# Patient Record
Sex: MALE | Race: WHITE | Employment: UNEMPLOYED | URBAN - METROPOLITAN AREA
[De-identification: names, ages, dates, MRNs, and addresses within clinical notes are randomized per-mention and may not be internally consistent; named-entity substitution may affect disease eponyms.]

---

## 2022-08-31 RX ORDER — HYDROCODONE BITARTRATE AND ACETAMINOPHEN 5; 325 MG/1; MG/1
1 TABLET ORAL EVERY 6 HOURS PRN
Status: ON HOLD | COMMUNITY
End: 2022-09-09 | Stop reason: HOSPADM

## 2022-08-31 RX ORDER — NAPROXEN 500 MG/1
500 TABLET ORAL 2 TIMES DAILY WITH MEALS
Status: ON HOLD | COMMUNITY
End: 2022-09-09 | Stop reason: HOSPADM

## 2022-09-09 ENCOUNTER — HOSPITAL ENCOUNTER (OUTPATIENT)
Age: 22
Setting detail: OUTPATIENT SURGERY
Discharge: HOME | End: 2022-09-09
Attending: ORTHOPAEDIC SURGERY | Admitting: ORTHOPAEDIC SURGERY
Payer: COMMERCIAL

## 2022-09-09 ENCOUNTER — ANESTHESIA (OUTPATIENT)
Dept: OPERATING ROOM | Age: 22
End: 2022-09-09
Payer: COMMERCIAL

## 2022-09-09 ENCOUNTER — ANESTHESIA EVENT (OUTPATIENT)
Dept: OPERATING ROOM | Age: 22
End: 2022-09-09
Payer: COMMERCIAL

## 2022-09-09 VITALS
OXYGEN SATURATION: 100 % | TEMPERATURE: 97.4 F | HEIGHT: 71 IN | DIASTOLIC BLOOD PRESSURE: 78 MMHG | RESPIRATION RATE: 12 BRPM | WEIGHT: 178 LBS | BODY MASS INDEX: 24.92 KG/M2 | HEART RATE: 64 BPM | SYSTOLIC BLOOD PRESSURE: 126 MMHG

## 2022-09-09 DIAGNOSIS — S82.142D CLOSED FRACTURE OF LEFT TIBIAL PLATEAU WITH ROUTINE HEALING, SUBSEQUENT ENCOUNTER: Primary | ICD-10-CM

## 2022-09-09 PROCEDURE — 3600000004 HC SURGERY LEVEL 4 BASE: Performed by: ORTHOPAEDIC SURGERY

## 2022-09-09 PROCEDURE — 6360000002 HC RX W HCPCS: Performed by: ANESTHESIOLOGY

## 2022-09-09 PROCEDURE — 64445 NJX AA&/STRD SCIATIC NRV IMG: CPT | Performed by: ANESTHESIOLOGY

## 2022-09-09 PROCEDURE — 6370000000 HC RX 637 (ALT 250 FOR IP): Performed by: ANESTHESIOLOGY

## 2022-09-09 PROCEDURE — 3600000014 HC SURGERY LEVEL 4 ADDTL 15MIN: Performed by: ORTHOPAEDIC SURGERY

## 2022-09-09 PROCEDURE — 6370000000 HC RX 637 (ALT 250 FOR IP)

## 2022-09-09 PROCEDURE — 2709999900 HC NON-CHARGEABLE SUPPLY: Performed by: ORTHOPAEDIC SURGERY

## 2022-09-09 PROCEDURE — 76942 ECHO GUIDE FOR BIOPSY: CPT | Performed by: ANESTHESIOLOGY

## 2022-09-09 PROCEDURE — 6360000002 HC RX W HCPCS

## 2022-09-09 PROCEDURE — 7100000010 HC PHASE II RECOVERY - FIRST 15 MIN: Performed by: ORTHOPAEDIC SURGERY

## 2022-09-09 PROCEDURE — 2580000003 HC RX 258: Performed by: ANESTHESIOLOGY

## 2022-09-09 PROCEDURE — 3700000000 HC ANESTHESIA ATTENDED CARE: Performed by: ORTHOPAEDIC SURGERY

## 2022-09-09 PROCEDURE — 7100000000 HC PACU RECOVERY - FIRST 15 MIN: Performed by: ORTHOPAEDIC SURGERY

## 2022-09-09 PROCEDURE — C1713 ANCHOR/SCREW BN/BN,TIS/BN: HCPCS | Performed by: ORTHOPAEDIC SURGERY

## 2022-09-09 PROCEDURE — 2500000003 HC RX 250 WO HCPCS

## 2022-09-09 PROCEDURE — 7100000001 HC PACU RECOVERY - ADDTL 15 MIN: Performed by: ORTHOPAEDIC SURGERY

## 2022-09-09 PROCEDURE — 3700000001 HC ADD 15 MINUTES (ANESTHESIA): Performed by: ORTHOPAEDIC SURGERY

## 2022-09-09 PROCEDURE — 6360000002 HC RX W HCPCS: Performed by: ORTHOPAEDIC SURGERY

## 2022-09-09 PROCEDURE — 2720000010 HC SURG SUPPLY STERILE: Performed by: ORTHOPAEDIC SURGERY

## 2022-09-09 PROCEDURE — 7100000011 HC PHASE II RECOVERY - ADDTL 15 MIN: Performed by: ORTHOPAEDIC SURGERY

## 2022-09-09 PROCEDURE — 2580000003 HC RX 258: Performed by: ORTHOPAEDIC SURGERY

## 2022-09-09 DEVICE — EVOS 3.5MM X 10MM LOCKING SCREW SELF-TAPPING
Type: IMPLANTABLE DEVICE | Site: ANTERIOR TIBIAL | Status: FUNCTIONAL
Brand: EVOS

## 2022-09-09 DEVICE — EVOS 3.5MM X 36MM CORTEX SCREW SELF-TAPPING
Type: IMPLANTABLE DEVICE | Site: ANTERIOR TIBIAL | Status: FUNCTIONAL
Brand: EVOS

## 2022-09-09 DEVICE — ENDOBUTTON CL ULTRA 15MM
Type: IMPLANTABLE DEVICE | Site: KNEE | Status: FUNCTIONAL
Brand: ENDOBUTTON

## 2022-09-09 DEVICE — EVOS 3.5MM PARTIAL ARTICULAR                                    LATERAL PROXIMAL TIBIA PLATE 4 HOLE                                    LEFT 70MM
Type: IMPLANTABLE DEVICE | Site: ANTERIOR TIBIAL | Status: FUNCTIONAL
Brand: EVOS

## 2022-09-09 DEVICE — EVOS 3.5MM X 34MM CORTEX SCREW SELF-TAPPING
Type: IMPLANTABLE DEVICE | Site: ANTERIOR TIBIAL | Status: FUNCTIONAL
Brand: EVOS

## 2022-09-09 RX ORDER — APREPITANT 40 MG/1
CAPSULE ORAL
Status: COMPLETED
Start: 2022-09-09 | End: 2022-09-09

## 2022-09-09 RX ORDER — ONDANSETRON 4 MG/1
4 TABLET, FILM COATED ORAL EVERY 6 HOURS PRN
Qty: 20 TABLET | Refills: 0 | Status: SHIPPED | OUTPATIENT
Start: 2022-09-09

## 2022-09-09 RX ORDER — MIDAZOLAM HYDROCHLORIDE 1 MG/ML
INJECTION INTRAMUSCULAR; INTRAVENOUS
Status: COMPLETED
Start: 2022-09-09 | End: 2022-09-09

## 2022-09-09 RX ORDER — APREPITANT 40 MG/1
40 CAPSULE ORAL ONCE
Status: DISCONTINUED | OUTPATIENT
Start: 2022-09-09 | End: 2022-09-09 | Stop reason: HOSPADM

## 2022-09-09 RX ORDER — CEFAZOLIN 2 G/1
INJECTION, POWDER, FOR SOLUTION INTRAMUSCULAR; INTRAVENOUS
Status: DISCONTINUED
Start: 2022-09-09 | End: 2022-09-09 | Stop reason: HOSPADM

## 2022-09-09 RX ORDER — LIDOCAINE HYDROCHLORIDE 10 MG/ML
INJECTION, SOLUTION INFILTRATION; PERINEURAL PRN
Status: DISCONTINUED | OUTPATIENT
Start: 2022-09-09 | End: 2022-09-09 | Stop reason: SDUPTHER

## 2022-09-09 RX ORDER — SCOLOPAMINE TRANSDERMAL SYSTEM 1 MG/1
1 PATCH, EXTENDED RELEASE TRANSDERMAL ONCE
Status: DISCONTINUED | OUTPATIENT
Start: 2022-09-09 | End: 2022-09-09 | Stop reason: HOSPADM

## 2022-09-09 RX ORDER — LABETALOL HYDROCHLORIDE 5 MG/ML
5 INJECTION, SOLUTION INTRAVENOUS EVERY 10 MIN PRN
Status: DISCONTINUED | OUTPATIENT
Start: 2022-09-09 | End: 2022-09-09 | Stop reason: HOSPADM

## 2022-09-09 RX ORDER — OXYCODONE HYDROCHLORIDE 5 MG/1
5 TABLET ORAL PRN
Status: COMPLETED | OUTPATIENT
Start: 2022-09-09 | End: 2022-09-09

## 2022-09-09 RX ORDER — DOCUSATE SODIUM 100 MG/1
100 CAPSULE, LIQUID FILLED ORAL 2 TIMES DAILY PRN
Qty: 20 CAPSULE | Refills: 0 | Status: SHIPPED | OUTPATIENT
Start: 2022-09-09 | End: 2022-10-09

## 2022-09-09 RX ORDER — MEPERIDINE HYDROCHLORIDE 25 MG/ML
12.5 INJECTION INTRAMUSCULAR; INTRAVENOUS; SUBCUTANEOUS EVERY 5 MIN PRN
Status: DISCONTINUED | OUTPATIENT
Start: 2022-09-09 | End: 2022-09-09 | Stop reason: HOSPADM

## 2022-09-09 RX ORDER — KETOROLAC TROMETHAMINE 30 MG/ML
INJECTION, SOLUTION INTRAMUSCULAR; INTRAVENOUS PRN
Status: DISCONTINUED | OUTPATIENT
Start: 2022-09-09 | End: 2022-09-09 | Stop reason: SDUPTHER

## 2022-09-09 RX ORDER — OXYCODONE HYDROCHLORIDE 5 MG/1
10 TABLET ORAL PRN
Status: COMPLETED | OUTPATIENT
Start: 2022-09-09 | End: 2022-09-09

## 2022-09-09 RX ORDER — LIDOCAINE HYDROCHLORIDE 10 MG/ML
2 INJECTION, SOLUTION INFILTRATION; PERINEURAL
Status: DISCONTINUED | OUTPATIENT
Start: 2022-09-09 | End: 2022-09-09 | Stop reason: HOSPADM

## 2022-09-09 RX ORDER — SCOLOPAMINE TRANSDERMAL SYSTEM 1 MG/1
PATCH, EXTENDED RELEASE TRANSDERMAL
Status: COMPLETED
Start: 2022-09-09 | End: 2022-09-09

## 2022-09-09 RX ORDER — ONDANSETRON 2 MG/ML
4 INJECTION INTRAMUSCULAR; INTRAVENOUS
Status: DISCONTINUED | OUTPATIENT
Start: 2022-09-09 | End: 2022-09-09 | Stop reason: HOSPADM

## 2022-09-09 RX ORDER — OXYCODONE HYDROCHLORIDE AND ACETAMINOPHEN 5; 325 MG/1; MG/1
1 TABLET ORAL EVERY 6 HOURS PRN
Qty: 28 TABLET | Refills: 0 | Status: SHIPPED | OUTPATIENT
Start: 2022-09-09 | End: 2022-09-16

## 2022-09-09 RX ORDER — MIDAZOLAM HYDROCHLORIDE 1 MG/ML
INJECTION INTRAMUSCULAR; INTRAVENOUS
Status: DISCONTINUED
Start: 2022-09-09 | End: 2022-09-09 | Stop reason: HOSPADM

## 2022-09-09 RX ORDER — SODIUM CHLORIDE 0.9 % (FLUSH) 0.9 %
5-40 SYRINGE (ML) INJECTION EVERY 12 HOURS SCHEDULED
Status: DISCONTINUED | OUTPATIENT
Start: 2022-09-09 | End: 2022-09-09 | Stop reason: HOSPADM

## 2022-09-09 RX ORDER — SODIUM CHLORIDE, SODIUM LACTATE, POTASSIUM CHLORIDE, CALCIUM CHLORIDE 600; 310; 30; 20 MG/100ML; MG/100ML; MG/100ML; MG/100ML
INJECTION, SOLUTION INTRAVENOUS CONTINUOUS
Status: DISCONTINUED | OUTPATIENT
Start: 2022-09-09 | End: 2022-09-09 | Stop reason: HOSPADM

## 2022-09-09 RX ORDER — MIDAZOLAM HYDROCHLORIDE 1 MG/ML
INJECTION INTRAMUSCULAR; INTRAVENOUS PRN
Status: DISCONTINUED | OUTPATIENT
Start: 2022-09-09 | End: 2022-09-09 | Stop reason: SDUPTHER

## 2022-09-09 RX ORDER — IBUPROFEN 800 MG/1
800 TABLET ORAL 3 TIMES DAILY PRN
Qty: 90 TABLET | Refills: 3 | Status: SHIPPED | OUTPATIENT
Start: 2022-09-09

## 2022-09-09 RX ORDER — ASPIRIN 81 MG/1
81 TABLET ORAL DAILY
Qty: 20 TABLET | Refills: 0 | Status: SHIPPED | OUTPATIENT
Start: 2022-09-09

## 2022-09-09 RX ORDER — ONDANSETRON 2 MG/ML
INJECTION INTRAMUSCULAR; INTRAVENOUS PRN
Status: DISCONTINUED | OUTPATIENT
Start: 2022-09-09 | End: 2022-09-09 | Stop reason: SDUPTHER

## 2022-09-09 RX ORDER — SODIUM CHLORIDE 9 MG/ML
INJECTION, SOLUTION INTRAVENOUS PRN
Status: DISCONTINUED | OUTPATIENT
Start: 2022-09-09 | End: 2022-09-09 | Stop reason: HOSPADM

## 2022-09-09 RX ORDER — DEXAMETHASONE SODIUM PHOSPHATE 4 MG/ML
INJECTION, SOLUTION INTRA-ARTICULAR; INTRALESIONAL; INTRAMUSCULAR; INTRAVENOUS; SOFT TISSUE PRN
Status: DISCONTINUED | OUTPATIENT
Start: 2022-09-09 | End: 2022-09-09 | Stop reason: SDUPTHER

## 2022-09-09 RX ORDER — PROPOFOL 10 MG/ML
INJECTION, EMULSION INTRAVENOUS PRN
Status: DISCONTINUED | OUTPATIENT
Start: 2022-09-09 | End: 2022-09-09 | Stop reason: SDUPTHER

## 2022-09-09 RX ORDER — DIPHENHYDRAMINE HYDROCHLORIDE 50 MG/ML
12.5 INJECTION INTRAMUSCULAR; INTRAVENOUS
Status: DISCONTINUED | OUTPATIENT
Start: 2022-09-09 | End: 2022-09-09 | Stop reason: HOSPADM

## 2022-09-09 RX ORDER — ROCURONIUM BROMIDE 10 MG/ML
INJECTION, SOLUTION INTRAVENOUS PRN
Status: DISCONTINUED | OUTPATIENT
Start: 2022-09-09 | End: 2022-09-09 | Stop reason: SDUPTHER

## 2022-09-09 RX ORDER — SODIUM CHLORIDE 0.9 % (FLUSH) 0.9 %
5-40 SYRINGE (ML) INJECTION PRN
Status: DISCONTINUED | OUTPATIENT
Start: 2022-09-09 | End: 2022-09-09 | Stop reason: HOSPADM

## 2022-09-09 RX ORDER — FENTANYL CITRATE 50 UG/ML
INJECTION, SOLUTION INTRAMUSCULAR; INTRAVENOUS PRN
Status: DISCONTINUED | OUTPATIENT
Start: 2022-09-09 | End: 2022-09-09 | Stop reason: SDUPTHER

## 2022-09-09 RX ADMIN — KETOROLAC TROMETHAMINE 30 MG: 30 INJECTION, SOLUTION INTRAMUSCULAR at 13:28

## 2022-09-09 RX ADMIN — ROCURONIUM BROMIDE 10 MG: 10 INJECTION, SOLUTION INTRAVENOUS at 10:57

## 2022-09-09 RX ADMIN — FENTANYL CITRATE 25 MCG: 50 INJECTION INTRAMUSCULAR; INTRAVENOUS at 10:08

## 2022-09-09 RX ADMIN — FENTANYL CITRATE 25 MCG: 50 INJECTION INTRAMUSCULAR; INTRAVENOUS at 10:57

## 2022-09-09 RX ADMIN — HYDROMORPHONE HYDROCHLORIDE 0.25 MG: 1 INJECTION, SOLUTION INTRAMUSCULAR; INTRAVENOUS; SUBCUTANEOUS at 14:37

## 2022-09-09 RX ADMIN — FENTANYL CITRATE 25 MCG: 50 INJECTION INTRAMUSCULAR; INTRAVENOUS at 13:37

## 2022-09-09 RX ADMIN — CEFAZOLIN 2000 MG: 2 INJECTION, POWDER, FOR SOLUTION INTRAMUSCULAR; INTRAVENOUS at 13:01

## 2022-09-09 RX ADMIN — LIDOCAINE HYDROCHLORIDE 100 MG: 10 INJECTION, SOLUTION INFILTRATION; PERINEURAL at 09:09

## 2022-09-09 RX ADMIN — SODIUM CHLORIDE, POTASSIUM CHLORIDE, SODIUM LACTATE AND CALCIUM CHLORIDE: 600; 310; 30; 20 INJECTION, SOLUTION INTRAVENOUS at 07:23

## 2022-09-09 RX ADMIN — SUGAMMADEX 200 MG: 100 INJECTION, SOLUTION INTRAVENOUS at 13:14

## 2022-09-09 RX ADMIN — ROCURONIUM BROMIDE 10 MG: 10 INJECTION, SOLUTION INTRAVENOUS at 10:06

## 2022-09-09 RX ADMIN — MIDAZOLAM 2 MG: 1 INJECTION INTRAMUSCULAR; INTRAVENOUS at 09:05

## 2022-09-09 RX ADMIN — APREPITANT 40 MG: 40 CAPSULE ORAL at 08:19

## 2022-09-09 RX ADMIN — ONDANSETRON HYDROCHLORIDE 4 MG: 2 INJECTION, SOLUTION INTRAMUSCULAR; INTRAVENOUS at 13:28

## 2022-09-09 RX ADMIN — FENTANYL CITRATE 100 MCG: 50 INJECTION INTRAMUSCULAR; INTRAVENOUS at 09:09

## 2022-09-09 RX ADMIN — HYDROMORPHONE HYDROCHLORIDE 0.25 MG: 1 INJECTION, SOLUTION INTRAMUSCULAR; INTRAVENOUS; SUBCUTANEOUS at 14:23

## 2022-09-09 RX ADMIN — OXYCODONE 5 MG: 5 TABLET ORAL at 14:42

## 2022-09-09 RX ADMIN — PROPOFOL 400 MG: 10 INJECTION, EMULSION INTRAVENOUS at 09:09

## 2022-09-09 RX ADMIN — CEFAZOLIN 2000 MG: 2 INJECTION, POWDER, FOR SOLUTION INTRAMUSCULAR; INTRAVENOUS at 09:09

## 2022-09-09 RX ADMIN — SODIUM CHLORIDE, POTASSIUM CHLORIDE, SODIUM LACTATE AND CALCIUM CHLORIDE: 600; 310; 30; 20 INJECTION, SOLUTION INTRAVENOUS at 10:55

## 2022-09-09 RX ADMIN — FENTANYL CITRATE 25 MCG: 50 INJECTION INTRAMUSCULAR; INTRAVENOUS at 09:45

## 2022-09-09 RX ADMIN — DEXAMETHASONE SODIUM PHOSPHATE 10 MG: 4 INJECTION, SOLUTION INTRAMUSCULAR; INTRAVENOUS at 09:20

## 2022-09-09 RX ADMIN — ROCURONIUM BROMIDE 60 MG: 10 INJECTION, SOLUTION INTRAVENOUS at 09:10

## 2022-09-09 ASSESSMENT — PAIN - FUNCTIONAL ASSESSMENT
PAIN_FUNCTIONAL_ASSESSMENT: PREVENTS OR INTERFERES WITH ALL ACTIVE AND SOME PASSIVE ACTIVITIES
PAIN_FUNCTIONAL_ASSESSMENT: 0-10

## 2022-09-09 ASSESSMENT — PAIN SCALES - GENERAL
PAINLEVEL_OUTOF10: 4
PAINLEVEL_OUTOF10: 3
PAINLEVEL_OUTOF10: 5
PAINLEVEL_OUTOF10: 0
PAINLEVEL_OUTOF10: 5
PAINLEVEL_OUTOF10: 0
PAINLEVEL_OUTOF10: 5
PAINLEVEL_OUTOF10: 5
PAINLEVEL_OUTOF10: 0

## 2022-09-09 ASSESSMENT — PAIN DESCRIPTION - DESCRIPTORS
DESCRIPTORS: ACHING
DESCRIPTORS: ACHING;BURNING

## 2022-09-09 ASSESSMENT — PAIN DESCRIPTION - LOCATION
LOCATION: KNEE

## 2022-09-09 ASSESSMENT — PAIN DESCRIPTION - ORIENTATION
ORIENTATION: LEFT

## 2022-09-09 NOTE — ANESTHESIA POSTPROCEDURE EVALUATION
Department of Anesthesiology  Postprocedure Note    Patient: Franki Somers  MRN: [id number]  YOB: 2000  Date of evaluation: 9/9/2022      Procedure Summary     Date: 09/09/22 Room / Location: [address] [place] [address] OR 01 / [facility]    Anesthesia Start: 9213 Anesthesia Stop: 5845    Procedure: LEFT KNEE OPEN REDUCTION INTERNAL FIXATION TIBIAL PLATEAU, ARTHROSCOPY WITH ANTERIOR CRUCIATE LIGAMENT AVULSION REPAIR MEDIAL AND LATERAL MENISCUS REPAIR (Left: Knee) Diagnosis:       Tibial plateau fracture, left, closed, initial encounter      (LEFT KNEE TIBIAL PLATEAU FRACTURE)    Surgeons: Junaid Marcum MD Responsible Provider: Daphne Jackson MD    Anesthesia Type: general ASA Status: 2          Anesthesia Type: No value filed.     Nikia Phase I: Nikia Score: 10    Nikia Phase II: Nikia Score: 10      Anesthesia Post Evaluation    Comments: Postoperative Anesthesia Note    Name:    Franki Somers  MRN:      [id number]    Patient Vitals in the past 12 hrs:  09/09/22 1501, BP:123/80, Pulse:64, Resp:12, SpO2:100 %  09/09/22 1446, BP:137/73, Temp:97.4 °F (36.3 °C), Temp src:Temporal, Pulse:82, Resp:13, SpO2:100 %  09/09/22 1431, BP:122/75, Pulse:80, Resp:13, SpO2:100 %  09/09/22 1416, BP:120/67, Pulse:83, Resp:13, SpO2:100 %  09/09/22 1411, BP:117/70, Pulse:84, Resp:14, SpO2:100 %  09/09/22 1406, BP:114/64, Pulse:88, Resp:17, SpO2:100 %  09/09/22 1401, BP:121/67, Temp:97 °F (36.1 °C), Temp src:Temporal, Pulse:92, Resp:20, SpO2:100 %  09/09/22 0823, BP:116/77, Pulse:83, Resp:12  09/09/22 0814, BP:118/63, Pulse:76, Resp:18, SpO2:97 %  09/09/22 0749, BP:115/76, Pulse:81, Resp:11, SpO2:98 %  09/09/22 0701, BP:124/70, Temp:97.7 °F (36.5 °C), Temp src:Temporal, Pulse:93, Resp:16, SpO2:98 %, Height:5' 11\" (1.803 m), Weight:178 lb (80.7 kg)     LABS:    CBC  No results found for: WBC, HGB, HCT, PLT  RENAL  No results found for: NA, K, CL, CO2, BUN, CREATININE, GLUCOSE  COAGS  No results found for: PROTIME, INR, APTT    Intake & Output:  @24HRIO@    Nausea & Vomiting:  No    Level of Consciousness:  Awake    Pain Assessment:  Adequate analgesia    Anesthesia Complications:  No apparent anesthetic complications    SUMMARY      Vital signs stable  OK to discharge from Stage I post anesthesia care.   Care transferred from Anesthesiology department on discharge from perioperative area

## 2022-09-09 NOTE — DISCHARGE INSTRUCTIONS
See printed instructions in folder. - Dr. Adalgisa Farooq    *******************************    PERIPHERAL NERVE BLOCK INSTRUCTIONS     Please remember while having a nerve block you are at an increased risk for [address]!! You were given a nerve block today from the anesthesiologist. Most nerve blocks last anywhere from 6-36 hours. You should start taking your pain medication before the block wears off or when you first begin feeling discomfort. It takes at least 30-60 minutes for a pain pill to take effect. Pain medications should be taken with food. Consider setting an alarm through the night to help manage your pain level so you do not wake up with too much pain. Pain medicines can cause more sedation and decrease your breathing so ONLY take as directed. If you have Sleep Apnea, you definitely need to use your C-Pap machine. What to expect after a nerve block:   Numbness, tingling- arm or leg feels heavy or asleep  Weakness or inability to move or control your arm or leg   Inability to feel temperature changes to your arm or leg  Usually the weakness wears off first, followed by the tingling or heaviness. You may notice more pain at this point and should start taking your pain meds. If you had a shoulder block, you may experience:  Mild shortness of breath (may be relieved by sitting up in a chair or recliner)  Hoarse voice  Blurry vision  Unequal pupils  Drooping of your face (eye or lip) on the same side as the nerve block  Swelling at the injection site on the side of your neck  These side effects should resolve as the block wears off! IF you have severe or prolonged shortness of breath-  GO to the nearest Emergency Room! If you had a nerve block of your arm or leg:  Protect the arm or leg from extreme hot or cold temperatures  Protect the arm or leg from obstructing blood flow by frequent position changes- Make sure fingers/ toes stay pink and warm.  Call surgeon with any changes  For leg block, get assistance walking until the block wears off, ie:  crutches, walker, support person   If you continue to feel the effects of the nerve block for longer than 72 hours- call Ginny Sumner at [phone] and ask to speak with the Anesthesiologist on call. ANESTHESIA DISCHARGE INSTRUCTIONS    You are under the influence of drugs- do not drink alcohol, drive a car, operate machinery(such as power tools, kitchen appliances, etc), sign legal documents, or make any important decisions for 24 hours (or while on pain medications). Children should not ride bikes or [place] or play on gym sets  for 24 hours after surgery. A responsible adult should be with you for 24 hours. Rest at home today- increase activity as tolerated. Progress slowly to a regular diet unless your physician has instructed you otherwise. Drink plenty of water. CALL YOUR DOCTOR IF YOU:  Have moderate to severe nausea or vomiting AND are unable to hold down fluids or prescribed medications. Have bright red bloody drainage from your dressing that won't stop oozing. Do not get relief with your pain medication    NORMAL (POSSIBLE) SIDE EFFECTS FROM ANESTHESIA:     Confusion, temporary memory loss, delayed reaction times in the first 24 hours  Lightheadedness, dizziness, difficulty focusing, blurred vision  Nausea/vomiting can happen  Shivering, feeling cold, sore throat, cough and muscle aches should stop within 24-48 hours  Trouble urinating - call your surgeon if it has been more than 8 hrs  Bruising or soreness at the IV site - call if it remains red, firm or there is drainage             The following instructions are to be followed if you have a known history or diagnosis of sleep apnea: For all sleep apnea patients:  ? Sleep on your side or sitting up in a chair whenever possible, especially the first 24 hours after surgery. ? Use only medicines prescribed by your doctor. ? Do not drink alcohol.   ? If you have a dental device to assist you while at rest, use it at all times for the first 24 hours. For patients using CPAP machines:  ? Use your CPAP machine during all periods of sleep as usual.  ? Use your CPAP machine during all periods of daytime rest while on pain medicines. ** Follow up with your primary care doctor for continued care. IF YOU DO NOT TAKE ALL OF YOUR NARCOTIC PAIN MEDICATION, please dispose of them responsibly. There are drop off boxes in the Emergency Departments 24/7 at both [facility] and [facility]. If these locations are not convenient, other options for discarding them can be found at:  [email or web address]    Hospital or office staff may NOT accept any medications to drop off in the cabinet for you.

## 2022-09-09 NOTE — PROGRESS NOTES
Canela Catheter inserted in OR by Konstantin Goldberg RN prior to surgery. Clear yellow urine draining. Removed canela at end of surgery. 800ml of urine in bag.

## 2022-09-09 NOTE — H&P
Pre-operative History and Physical    Nellie Marcus (2000)  9/9/2022 Date         CHIEF COMPLAINT:  left knee pain      HISTORY OF PRESENT ILLNESS:                The patient is a 23 y.o. male who presents with above chief complaint. He sustained a left knee tibial plateau fracture with avulsion of the ACL and meniscus tears as well as lateral collateral ligament complex injuries. He is here today for further treatment. Past Medical History:    History reviewed. No pertinent past medical history.     Past Surgical History:        Procedure Laterality Date    ANKLE SURGERY Right     Growth Plate    ANTERIOR CRUCIATE LIGAMENT REPAIR Right     FINGER NAIL SURGERY Left        Current Medications:   Current Facility-Administered Medications: ceFAZolin (ANCEF) 2,000 mg in sodium chloride 0.9 % 50 mL IVPB (mini-bag), 2,000 mg, IntraVENous, Once  lidocaine 1 % injection 2 mL, 2 mL, IntraDERmal, Once PRN  lactated ringers infusion, , IntraVENous, Continuous  midazolam (VERSED) 2 MG/2ML injection, , ,   midazolam (VERSED) 2 MG/2ML injection, , ,   sodium chloride flush 0.9 % injection 5-40 mL, 5-40 mL, IntraVENous, 2 times per day  sodium chloride flush 0.9 % injection 5-40 mL, 5-40 mL, IntraVENous, PRN  0.9 % sodium chloride infusion, , IntraVENous, PRN  meperidine (DEMEROL) injection 12.5 mg, 12.5 mg, IntraVENous, Q5 Min PRN  HYDROmorphone (DILAUDID) injection 0.25 mg, 0.25 mg, IntraVENous, Q10 Min PRN  HYDROmorphone (DILAUDID) injection 0.5 mg, 0.5 mg, IntraVENous, Q10 Min PRN  oxyCODONE (ROXICODONE) immediate release tablet 5 mg, 5 mg, Oral, PRN **OR** oxyCODONE (ROXICODONE) immediate release tablet 10 mg, 10 mg, Oral, PRN  ondansetron (ZOFRAN) injection 4 mg, 4 mg, IntraVENous, Q15 Min PRN  diphenhydrAMINE (BENADRYL) injection 12.5 mg, 12.5 mg, IntraVENous, Once PRN  labetalol (NORMODYNE;TRANDATE) injection 5 mg, 5 mg, IntraVENous, Q10 Min PRN  aprepitant (EMEND) capsule 40 mg, 40 mg, Oral, Once  scopolamine (TRANSDERM-SCOP) transdermal patch 1 patch, 1 patch, TransDERmal, Once    Allergies:  Patient has no known allergies. Social History:   Non-smoker    Family History:   History reviewed. No pertinent family history. REVIEW OF SYSTEMS:    Review of systems performed and no pertinent positives other than left knee pain and stiffness    PHYSICAL EXAM:      General: alert, appears stated age and cooperative   Skin: Clean, dry, intact   Extremity: Distal NVI   DVT Exam: No evidence of DVT seen on physical exam.     + dorsal pedis and posterior tibial pulse palpable, +sensation intact to light touch L4-S1, thigh and calf compartments soft and compressible, motor function intact ehl, df, pf.   Good cap refill <2 sec    + radial and ulnar pulse palpable, + sensation to light touch C5-T1, arm and forearm compartments soft and compressible, motor function intact to PIN, AIN, M, U, R nerves. Good cap refill <2 sec        /63   Pulse 76   Temp 97.7 °F (36.5 °C) (Temporal)   Resp 18   Ht 5' 11\" (1.803 m)   Wt 178 lb (80.7 kg)   SpO2 97%   BMI 24.83 kg/m²     IMPRESSION/RECOMMENDATIONS:    Assessment:-year-old male with a left knee tibial plateau fracture with lateral split as well as tibial eminence fracture of the ACL avulsion with proximal fibula fracture with posterior lateral corner injury and medial lateral meniscus tears and root injury    Plan:  1) plan for ORIF left tibial plateau with left knee arthroscopy with ACL avulsion repair and medial lateral meniscus repair versus partial meniscectomy and possible posterior lateral corner repair all questions answered. Risk, benefits, alternatives discussed and the patient wishes to move forward    [employer].  MD Coy

## 2022-09-09 NOTE — PROGRESS NOTES
Pt states the pain is still a \"5\", still aching. Medicated with Dilaudid. Pt's mother is at bedside.
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
## 2022-09-09 NOTE — PROGRESS NOTES
Discharge  instructions reviewed. Pt and family verbalize understanding with no further questions. VSS. Pt discharged via [facility] to car. Assessment unchanged.

## 2022-09-09 NOTE — BRIEF OP NOTE
Brief Postoperative Note      Patient: Berenice Mims  YOB: 2000  MRN: [id number]    Date of Procedure: 9/9/2022    Pre-Op Diagnosis: Left knee tibial plateau fracture with ACL avulsion, medial and lateral meniscus tears, lateral collateral ligament sprain posterior lateral corner sprain    Post-Op Diagnosis: Same       Procedure(s):  1. Left knee arthroscopy with ACL avulsion fracture repair (68770)  2. Left knee arthroscopy with medial and lateral meniscus repair (06132)  Knee. Left knee open reduction internal fixation tibial plateau fracture (23468)  3. X-ray interpretation 2 views left knee intraoperative (80512)    Surgeon(s):  Breonna Garcia MD    Assistant:  Surgical Assistant: Kimberly Gay    Anesthesia: General and block    Estimated Blood Loss (mL): less than 50     Complications: None    Specimens:   * No specimens in log *    Implants:  Implant Name Type Inv. Item Serial No.  Lot No. LRB No. Used Action   PLATE BONE [id number] 4 H STRL LT LAT PROX TIB S STL PART ART - [id number]  PLATE BONE [id number] 4 H STRL LT LAT PROX TIB S STL PART [facility]  Left 1 Implanted   SCREW BONE L34MM DIA3.5MM LENNIE S STL ST FOR SM PLATING SYS - [id number]  SCREW BONE L34MM DIA3.5MM LENNIE S STL ST FOR SM PLATING SYS  [facility]  Left 1 Implanted   SCREW BONE L36MM DIA3.5MM LENNIE S STL ST FOR SM PLATING SYS - [id number]  SCREW BONE L36MM DIA3.5MM LENNIE S STL ST FOR SM PLATING SYS  [employer] [facility]  Left 1 Implanted   SCREW BONE L10MM DIA3. 5MM STRL S STL LCK ST FOR SM PLATING - [id number]  SCREW BONE L10MM DIA3. 5MM STRL S STL LCK ST FOR SM PLATING  [facility]  Left 1 Implanted   NEEDLE SUT CRV FOR DEL MENISCI REP SYS FAST- - [id number]  NEEDLE SUT CRV FOR DEL MENISCI REP SYS FAST-  [facility] [id number] Left 1 Implanted   NEEDLE SUT CRV FOR DEL MENISCI REP SYS FAST- - [id number] NEEDLE SUT CRV FOR DEL MENISCI REP SYS FAST-  [employer] [id number] Left 1 Implanted   NEEDLE SUT CRV FOR DEL MENISCI REP SYS FAST- - [id number]  NEEDLE SUT CRV FOR DEL MENISCI REP SYS FAST-  [employer] [id number] Left 1 Implanted   NEEDLE SUT REV FOR DEL MENISCI REP SYS FAST- - [id number]  NEEDLE SUT REV FOR DEL MENISCI REP SYS FAST-  [employer] [id number] Left 1 Implanted   BUTTON FIX L15MM TI CONT LOOP FOR ACL RECON ENDOBTTN CL ULT - [id number]  BUTTON FIX L15MM TI CONT LOOP FOR ACL RECON ENDOBTTN CL ULT  [employer] [id number] Left 1 Implanted         Drains: * No LDAs found *    Antibiotics: 2 g Ancef IV prior to incision and redosed preoperatively    Findings: Displaced ACL avulsion fracture off the tibial eminence, lateral tibial plateau split fracture, posterior horn medial meniscus undersurface tear, posterior horn lateral meniscus vertical longitudinal tear, osteochondral injury to the anteromedial tibial plateau, grade 2-3 changes central aspect the medial femoral condyle, fissuring of the cartilage of the lateral femoral condyle lateral tibial plateau    Electronically signed by Kb Clifford MD on 9/9/2022 at 1:46 PM

## 2022-09-09 NOTE — ANESTHESIA PROCEDURE NOTES
Peripheral Block    Patient location during procedure: pre-op  Reason for block: post-op pain management and at surgeon's request  Start time: 9/9/2022 8:14 AM  End time: 9/9/2022 8:15 AM  Staffing  Performed: anesthesiologist   Anesthesiologist: Dionisio Gay MD  Preanesthetic Checklist  Completed: patient identified, IV checked, site marked, risks and benefits discussed, surgical/procedural consents, equipment checked, pre-op evaluation, timeout performed, anesthesia consent given, oxygen available and monitors applied/VS acknowledged  Peripheral Block   Patient position: supine  Prep: ChloraPrep  Provider prep: mask and sterile gloves  Patient monitoring: cardiac monitor, continuous pulse ox, frequent blood pressure checks and IV access  Block type: Femoral  Femoral crease (Low Femoral)  Laterality: right  Injection technique: single-shot  Guidance: ultrasound guided  Local infiltration: lidocaine  Infiltration strength: 1 %  Local infiltration: lidocaine  Dose: 3 mL    Needle   Needle gauge: 21 G  Needle localization: ultrasound guidance  Needle length: 10 cm  Assessment   Injection assessment: negative aspiration for heme, no paresthesia on injection and local visualized surrounding nerve on ultrasound  Paresthesia pain: none  Slow fractionated injection: yes  Hemodynamics: stable  Real-time US image taken/store: yes  Outcomes: uncomplicated and patient tolerated procedure well    Additional Notes  Immediately prior to procedure a \"time out\" was called to verify the correct patient, allergies, laterality, procedure and equipment. Time out performed with  RN    Local Anesthetic: 0.5 %  Bupivacaine   Amount: 20 ml  in 5 ml increments after negative aspiration each time. Iliopsoas Muscle and Fascia Iliaca, Femoral artery (Deep artery to the thigh take off), Femoral Vein and Femoral Nerve are identified; the tip of the need and the spread of the local anesthetic around the Femoral nerve are visualized.  The Femoral nerve appeared to be anatomically normal and there were no abnormal pathologically findings seen.      Versed 4mg

## 2022-09-09 NOTE — ANESTHESIA PRE PROCEDURE
Department of Anesthesiology  Preprocedure Note       Name:  Olayinka Boucher   Age:  23 y.o.  :  2000                                          MRN:  [id number]         Date:  2022      Surgeon: Teena Rodney):  Debra Somers MD    Procedure: Procedure(s):  LEFT KNEE OPEN REDUCTION INTERNAL FIXATION TIBIAL PLATEAU, ARTHROSCOPY WITH ANTERIOR CRUCIATE LIGAMENT AVULSION REPAIR MEDIAL AND LATERAL MENISCUS REPAIR VERSUS PARTIAL MENISCECTOMY POSSIBLE POSTEROLATERIAL CORNER REPAIR    Medications prior to admission:   Prior to Admission medications    Medication Sig Start Date End Date Taking? Authorizing Provider   HYDROcodone-acetaminophen (NORCO) 5-325 MG per tablet Take 1 tablet by mouth every 6 hours as needed for Pain. Yes Historical Provider, MD   naproxen (NAPROSYN) 500 MG tablet Take 500 mg by mouth 2 times daily (with meals)   Yes Historical Provider, MD       Current medications:    No current facility-administered medications for this encounter. Allergies:  No Known Allergies    Problem List:  There is no problem list on file for this patient. Past Medical History:  History reviewed. No pertinent past medical history.     Past Surgical History:        Procedure Laterality Date    ANKLE SURGERY Right     Growth Plate    ANTERIOR CRUCIATE LIGAMENT REPAIR Right        Social History:    Social History     Tobacco Use    Smoking status: Former     Types: Cigarettes    Smokeless tobacco: Never   Substance Use Topics    Alcohol use: Not Currently                                Counseling given: Not Answered      Vital Signs (Current):   Vitals:    22 1314   Weight: 180 lb (81.6 kg)   Height: 6' (1.829 m)                                              BP Readings from Last 3 Encounters:   No data found for BP       NPO Status:                                                                                 BMI:   Wt Readings from Last 3 Encounters:   22 180 lb (81.6 kg)     Body mass index is 24.41 kg/m². CBC: No results found for: WBC, RBC, HGB, HCT, MCV, RDW, PLT    CMP: No results found for: NA, K, CL, CO2, BUN, CREATININE, GFRAA, AGRATIO, LABGLOM, GLUCOSE, GLU, PROT, CALCIUM, BILITOT, ALKPHOS, AST, ALT    POC Tests: No results for input(s): POCGLU, POCNA, POCK, POCCL, POCBUN, POCHEMO, POCHCT in the last 72 hours. Coags: No results found for: PROTIME, INR, APTT    HCG (If Applicable): No results found for: PREGTESTUR, PREGSERUM, HCG, HCGQUANT     ABGs: No results found for: PHART, PO2ART, ZNW6MYW, DLW8CLB, BEART, F4XCWGOG     Type & Screen (If Applicable):  No results found for: LABABO, LABRH    Drug/Infectious Status (If Applicable):  No results found for: HIV, HEPCAB    COVID-19 Screening (If Applicable): No results found for: COVID19        Anesthesia Evaluation  Patient summary reviewed and Nursing notes reviewed no history of anesthetic complications:   Airway: Mallampati: II     Neck ROM: full     Dental:          Pulmonary:Negative Pulmonary ROS and normal exam                               Cardiovascular:Negative CV ROS                      Neuro/Psych:   Negative Neuro/Psych ROS              GI/Hepatic/Renal: Neg GI/Hepatic/Renal ROS       (-) hiatal hernia and GERD       Endo/Other: Negative Endo/Other ROS                    Abdominal:             Vascular: Other Findings:           Anesthesia Plan      general     ASA 2     (I discussed with the patient the risks and benefits of regional anesthesia (inlcuding infection, bleeding, damage to nerves and surrounding structures) PIV, General, IV Narcotics, PACU. All questions were answered the patient agrees with the plan and wishes to proceed.)  Induction: intravenous.                             Haritha Roman MD   9/9/2022

## 2022-09-09 NOTE — PROGRESS NOTES
Pt started grimacing. He has been shivering since he came out. States he has pain below his kneecap. Rates it a \"5\". Medicated with Dilaudid IV.

## 2022-09-09 NOTE — ANESTHESIA PROCEDURE NOTES
Peripheral Block    Patient location during procedure: pre-op  Reason for block: post-op pain management and at surgeon's request  Start time: 9/9/2022 8:11 AM  End time: 9/9/2022 8:12 AM  Staffing  Performed: anesthesiologist   Anesthesiologist: Tova Jordan MD  Preanesthetic Checklist  Completed: patient identified, IV checked, site marked, risks and benefits discussed, surgical/procedural consents, equipment checked, pre-op evaluation, timeout performed, anesthesia consent given, oxygen available and monitors applied/VS acknowledged  Peripheral Block   Prep: ChloraPrep  Provider prep: mask and sterile gloves  Patient monitoring: cardiac monitor, continuous pulse ox, frequent blood pressure checks and IV access  Block type: Sciatic  Popliteal  Laterality: left  Injection technique: single-shot  Guidance: nerve stimulator and ultrasound guided  Local infiltration: lidocaine  Infiltration strength: 1 %  Local infiltration: lidocaine  Dose: 3 mL    Needle   Needle gauge: 21 G  Needle localization: ultrasound guidance, nerve stimulator and anatomical landmarks  Needle length: 10 cm  Assessment   Injection assessment: negative aspiration for heme, no paresthesia on injection and local visualized surrounding nerve on ultrasound  Paresthesia pain: none  Slow fractionated injection: yes  Hemodynamics: stable  Real-time US image taken/store: yes  Outcomes: uncomplicated and patient tolerated procedure well    Additional Notes  Immediately prior to procedure a \"time out\" was called to verify the correct patient, allergies, laterality, procedure and equipment. Time out performed with  RN    Local Anesthetic: 0.5 %  Bupivacaine   Amount: 20 ml  in 5 ml increments after negative aspiration each time. Femur and Sciatic Nerve are identified, the tip of the needle and the spread of the local anesthetic around the Sciatic nerve are visualized.  The Sciatic nerve appeared to be anatomically normal and there were no abnormal pathologically findings seen.      Versed 4mg

## (undated) DEVICE — 3M™ STERI-DRAPE™ U-DRAPE 1015: Brand: STERI-DRAPE™

## (undated) DEVICE — BANDAGE COMPR M W6INXL10YD WHT BGE VELC E MTRX HK AND LOOP

## (undated) DEVICE — SOLUTION,SALINE,IRRGATION,500ML,STRL: Brand: MEDLINE

## (undated) DEVICE — GLOVE NON LATEX  SIZE 8.0

## (undated) DEVICE — PADDING CAST N ADH 12X6 IN CRIMPED FINISH 100% COTTON WBRLII

## (undated) DEVICE — FAST-FIX 360 REVERSED CURVE                                    MENISCAL REPAIR SYSTEM
Type: IMPLANTABLE DEVICE | Site: KNEE | Status: NON-FUNCTIONAL
Brand: FAST-FIX
Removed: 2022-09-09

## (undated) DEVICE — FIRSTPASS MINI STRAIGHT SUTURE PASSER: Brand: FIRSTPASS

## (undated) DEVICE — SODIUM CHL 0.9% IRRIG.

## (undated) DEVICE — MANIFOLD SURG NEPTUNE WST MGMT

## (undated) DEVICE — THREE QUARTER SHEET: Brand: CONVERTORS

## (undated) DEVICE — SUTURE MCRYL SZ 4-0 L18IN ABSRB UD L19MM PS-2 3/8 CIR PRIM Y496G

## (undated) DEVICE — TUBING PMP IRRIG GOFLO

## (undated) DEVICE — WEREWOLF FLOW 50 COBLATION WAND: Brand: COBLATION

## (undated) DEVICE — MEDI-VAC NON-CONDUCTIVE SUCTION TUBING: Brand: CARDINAL HEALTH

## (undated) DEVICE — SUTURE VCRL SZ 2-0 L18IN ABSRB UD CT-1 L36MM 1/2 CIR J839D

## (undated) DEVICE — Device

## (undated) DEVICE — PERI-LOC K-WIRE 1.6MM X 150MM                                    LENGTH TROCAR POINT
Type: IMPLANTABLE DEVICE | Site: ANTERIOR TIBIAL | Status: NON-FUNCTIONAL
Brand: PERI-LOC
Removed: 2022-09-09

## (undated) DEVICE — SMARTGOWN BREATHABLE SPECIALTY GOWN: Brand: CONVERTORS

## (undated) DEVICE — PACK PROCEDURE SURG SURGERYARTHROSCOPY KNEE

## (undated) DEVICE — SUTURE VCRL SZ 0 L18IN ABSRB UD L36MM CT-1 1/2 CIR J840D

## (undated) DEVICE — ACCU-PASS SUTURE SHUTTLE CRESCENT, STERILE: Brand: ACCU-PASS

## (undated) DEVICE — SOLUTION IV IRRIG 0.9% NACL 3000ML BAG 2B7477

## (undated) DEVICE — 9165 UNIVERSAL PATIENT PLATE: Brand: 3M™

## (undated) DEVICE — STERILE LATEX POWDER-FREE SURGICAL GLOVESWITH NITRILE COATING: Brand: PROTEXIS

## (undated) DEVICE — WEREWOLF FLOW 90 COBLATION WAND: Brand: COBLATION

## (undated) DEVICE — (6) MINITAPE (COBRAID WHITE) 39.5: Brand: MINITAPE

## (undated) DEVICE — PENCIL,CAUTERY,PUSHBUTTON,STERILE,LF,SS: Brand: MEDLINE

## (undated) DEVICE — CANNULA THREADED FLEX 8.0 X 72MM: Brand: CLEAR-TRAC

## (undated) DEVICE — SUTURE FIBERWIRE FIBERSTICK SZ 2-0 L50/12IN NONABSORBABLE AR7209

## (undated) DEVICE — FAST-FIX 360 CURVED MENISCAL                                    REPAIR SYSTEM
Type: IMPLANTABLE DEVICE | Site: KNEE | Status: NON-FUNCTIONAL
Brand: FAST-FIX
Removed: 2022-09-09

## (undated) DEVICE — (6) MINITAPE (BLUE) 39.5: Brand: MINITAPE